# Patient Record
Sex: MALE | Race: WHITE
[De-identification: names, ages, dates, MRNs, and addresses within clinical notes are randomized per-mention and may not be internally consistent; named-entity substitution may affect disease eponyms.]

---

## 2018-09-18 ENCOUNTER — HOSPITAL ENCOUNTER (EMERGENCY)
Dept: HOSPITAL 62 - ER | Age: 63
Discharge: ANOTHER HOSPITAL | End: 2018-09-18
Payer: SELF-PAY

## 2018-09-18 VITALS — SYSTOLIC BLOOD PRESSURE: 146 MMHG | DIASTOLIC BLOOD PRESSURE: 82 MMHG

## 2018-09-18 DIAGNOSIS — I63.8: Primary | ICD-10-CM

## 2018-09-18 DIAGNOSIS — R41.82: ICD-10-CM

## 2018-09-18 DIAGNOSIS — R47.01: ICD-10-CM

## 2018-09-18 DIAGNOSIS — E11.9: ICD-10-CM

## 2018-09-18 DIAGNOSIS — I10: ICD-10-CM

## 2018-09-18 LAB
ADD MANUAL DIFF: NO
ALBUMIN SERPL-MCNC: 4.2 G/DL (ref 3.5–5)
ALP SERPL-CCNC: 66 U/L (ref 38–126)
ALT SERPL-CCNC: 45 U/L (ref 21–72)
ANION GAP SERPL CALC-SCNC: 9 MMOL/L (ref 5–19)
APTT BLD: 33.3 SEC (ref 23.5–35.8)
AST SERPL-CCNC: 34 U/L (ref 17–59)
BASOPHILS # BLD AUTO: 0.1 10^3/UL (ref 0–0.2)
BASOPHILS NFR BLD AUTO: 0.6 % (ref 0–2)
BILIRUB DIRECT SERPL-MCNC: 0.6 MG/DL (ref 0–0.4)
BILIRUB SERPL-MCNC: 1.4 MG/DL (ref 0.2–1.3)
BUN SERPL-MCNC: 31 MG/DL (ref 7–20)
CALCIUM: 9.1 MG/DL (ref 8.4–10.2)
CHLORIDE SERPL-SCNC: 104 MMOL/L (ref 98–107)
CO2 SERPL-SCNC: 22 MMOL/L (ref 22–30)
EOSINOPHIL # BLD AUTO: 0.5 10^3/UL (ref 0–0.6)
EOSINOPHIL NFR BLD AUTO: 5.2 % (ref 0–6)
ERYTHROCYTE [DISTWIDTH] IN BLOOD BY AUTOMATED COUNT: 13.6 % (ref 11.5–14)
ETHANOL SERPL-MCNC: < 10 MG/DL
GLUCOSE SERPL-MCNC: 141 MG/DL (ref 75–110)
HCT VFR BLD CALC: 43.7 % (ref 37.9–51)
HGB BLD-MCNC: 15.4 G/DL (ref 13.5–17)
INR PPP: 1.08
LYMPHOCYTES # BLD AUTO: 1.9 10^3/UL (ref 0.5–4.7)
LYMPHOCYTES NFR BLD AUTO: 18.2 % (ref 13–45)
MCH RBC QN AUTO: 29.6 PG (ref 27–33.4)
MCHC RBC AUTO-ENTMCNC: 35.3 G/DL (ref 32–36)
MCV RBC AUTO: 84 FL (ref 80–97)
MONOCYTES # BLD AUTO: 0.8 10^3/UL (ref 0.1–1.4)
MONOCYTES NFR BLD AUTO: 7.9 % (ref 3–13)
NEUTROPHILS # BLD AUTO: 7 10^3/UL (ref 1.7–8.2)
NEUTS SEG NFR BLD AUTO: 68.1 % (ref 42–78)
PLATELET # BLD: 236 10^3/UL (ref 150–450)
POTASSIUM SERPL-SCNC: 4.7 MMOL/L (ref 3.6–5)
PROT SERPL-MCNC: 7.3 G/DL (ref 6.3–8.2)
PROTHROMBIN TIME: 14.6 SEC (ref 11.4–15.4)
RBC # BLD AUTO: 5.2 10^6/UL (ref 4.35–5.55)
SODIUM SERPL-SCNC: 134.6 MMOL/L (ref 137–145)
TOTAL CELLS COUNTED % (AUTO): 100 %
WBC # BLD AUTO: 10.2 10^3/UL (ref 4–10.5)

## 2018-09-18 PROCEDURE — 93005 ELECTROCARDIOGRAM TRACING: CPT

## 2018-09-18 PROCEDURE — 71045 X-RAY EXAM CHEST 1 VIEW: CPT

## 2018-09-18 PROCEDURE — 36415 COLL VENOUS BLD VENIPUNCTURE: CPT

## 2018-09-18 PROCEDURE — 80307 DRUG TEST PRSMV CHEM ANLYZR: CPT

## 2018-09-18 PROCEDURE — 70496 CT ANGIOGRAPHY HEAD: CPT

## 2018-09-18 PROCEDURE — 83735 ASSAY OF MAGNESIUM: CPT

## 2018-09-18 PROCEDURE — 70498 CT ANGIOGRAPHY NECK: CPT

## 2018-09-18 PROCEDURE — 85730 THROMBOPLASTIN TIME PARTIAL: CPT

## 2018-09-18 PROCEDURE — 85610 PROTHROMBIN TIME: CPT

## 2018-09-18 PROCEDURE — 80053 COMPREHEN METABOLIC PANEL: CPT

## 2018-09-18 PROCEDURE — 70450 CT HEAD/BRAIN W/O DYE: CPT

## 2018-09-18 PROCEDURE — 93010 ELECTROCARDIOGRAM REPORT: CPT

## 2018-09-18 PROCEDURE — 85025 COMPLETE CBC W/AUTO DIFF WBC: CPT

## 2018-09-18 NOTE — ER DOCUMENT REPORT
ED General





- General


Chief Complaint: Altered Mental Status


Stated Complaint: POSSIBLE STROKE


Time Seen by Provider: 09/18/18 18:19


Notes: 





Patient is a 63-year-old male with diabetes mellitus and hypertension that 

presents to the emergency department for chief complaint of altered mental 

status.  History provided by EMS as the patient is not following commands 

currently.  Per EMS the patient was last seen normal at 515.  He started acting 

unusual, not talking, not following commands, he is normally alert, and walking 

normally, and per family they told EMS that he is typically talkative, and does 

not act this way, and never has before.  No other history is obtainable at this 

time, has on history taking the patient is aphasic, and not talking at all at 

this time.


Further history obtained by his wife at bedside, stated that he suddenly 

stopped speaking, and appeared scared, and was not answering questions or 

acting his normal self





Past Medical History: Hypertension, diabetes mellitus


Past Surgical History: Not obtainable at this time


Social History: Smokes cigarettes daily, no alcohol or drug use.


Family History: Reviewed and noncontributory for presenting illness


Allergies: Reviewed, see documented allergy list. 





REVIEW OF SYSTEMS:


Unless otherwise stated in this report the patient's positive and negative 

responses for review of systems for constitutional, eyes, ENT, cardiovascular, 

respiratory, gastrointestinal, neurological, genitourinary, musculoskeletal, 

and integumentary systems and related systems to the presenting problem are 

either as stated in the HPI or were not pertinent or were negative for the 

symptoms and/or complaints related to the presenting medical problem.





PHYSICAL EXAMINATION:





Vital signs reviewed, nursing noted reviewed. 





GENERAL: Well-appearing, well-nourished and in no acute distress.





HEAD: Atraumatic, normocephalic.





EYES: Eyes appear normal, extraocular movements intact, sclera anicteric, 

conjunctiva are normal.





ENT: nares patent, oropharynx clear without exudates.  Moist mucous membranes.





NECK: Normal range of motion, supple without lymphadenopathy





LUNGS: Breath sounds clear to auscultation bilaterally and equal.  No wheezes 

rales or rhonchi.





HEART: Regular rate and rhythm without murmurs





ABDOMEN: Soft, nontender, normoactive bowel sounds.  No rebound, guarding, or 

rigidity. No masses appreciated.





EXTREMITIES: Nontender, good range of motion, no pitting or edema.  





NEUROLOGICAL: Patient moving all limbs, will withdrawal to pain in all limbs, 

appears to have receptive and expressive aphasia at this time, but would shake 

my hand, and follow what appeared to be social norms, in an attempt to 

understand what I was asking him.  But would not follow simple commands such as 

asking him to point to the ceiling with his left hand.  No obvious facial droop





PSYCH: Flat affect, aphasic





SKIN: Warm, Dry, normal turgor, no rashes or lesions noted on exposed skin








- Related Data


Allergies/Adverse Reactions: 


 





No Known Allergies Allergy (Unverified 09/18/18 19:27)


 











Past Medical History





- Social History


Smoking Status: Current Every Day Smoker


Family History: Reviewed & Not Pertinent





Physical Exam





- Vital signs


Vitals: 


 











Pulse Resp BP Pulse Ox


 


 109 H  18   118/71   97 


 


 09/18/18 18:10  09/18/18 18:10  09/18/18 18:10  09/18/18 18:10














Course





- Re-evaluation


Re-evalutation: 





Patient seen and examined vital signs reviewed. 





Laboratory data and imaging were ordered as appropriate for the patient's 

presenting symptoms and complaint, with consideration of any critical or life 

threatening conditions that may be associated with their obtained history and 

exam as noted above.





NIH stroke scale score: 6, for inability to follow commands, and severe aphasia





Results were reviewed when available and demonstrated acute thrombus in the 

left carotid artery, which is consistent with the patient's symptoms of aphasia

, on my repeat evaluation, the patient was speaking, and was still noted to 

have receptive aphasia





I called [facility], for transfer by [facility], for 

acute intervention, for his acute thrombus, causing his severe debilitating 

symptoms of expressive aphasia





Plan for TPA, waiting for callback, if they want me to treat the patient with 

TPA prior to transfer.





I personally discussed the risks and benefits of systemic TPA administration 

for acute CVA, including a 1 in 14 chance for intracranial hemorrhage, and 

other sources of bleeding, this patient I felt the benefits outweigh the risks, 

given that he was aphasic, which is a detriment, and severe disability, the 

patient's wife agreed and consented on the patient's behalf, as he currently 

had expressive aphasia.





Case discussed with [facility], agreed with TPA 

administration, this was ordered, patient was given bolus and infusion, 

reevaluation the patient was improved, patient will be transported, initially 

plan was for air transport, however they were not flying, and ground transfer 

had to be arranged despite best efforts to have air transport, this was 

discussed with both the patient and patient's wife who is at bedside, and they 

agreed with this plan of care.





Additionally the patient was given IV fluids after receiving contrast dye.





Evaluation was most consistent with acute CVA causing expressive aphasia





Results were discussed with the patient at this point after careful 

consideration I feel that that patient should be transferred to [facility] due to acute CVA, that potentially could benefit from 

clot retrieval, case was discussed with Dr. Blaze Damon.  This was 

discussed with the patient and his wife that it is in the best interest for 

their care to be transferred, the risks and benefits of transfer were discussed

, including but not limited to clinical deterioration during transport, 

respiratory distress, and potential for traumatic injuries. Patient agreed with 

this plan of care. 








Patient was seen prior to transport when EMS arrived, was in stable condition, 

and cleared for transport.





*Note is created using voice recognition software and may contain spelling, 

syntax or grammatical errors.








 





Chest X-Ray  09/18/18 00:00


IMPRESSION:  Cannot exclude left lower lobe pneumonia.


 








Head CT  09/18/18 18:19


IMPRESSION:  NORMAL BRAIN CT WITHOUT CONTRAST.


EVIDENCE OF ACUTE STROKE: NO.


 








Head CTA  09/18/18 18:19


IMPRESSION:  NO CTA EVIDENCE OF STENOSIS OR ANEURYSM OF THE [place].


 








Neck CTA  09/18/18 18:19


IMPRESSION:  FILLING DEFECT OCCUPYING MOST OF THE LUMEN OF THE PROXIMAL LEFT 

COMMON CAROTID ARTERY BEGINNING AT THE AORTIC ARCH AND EXTENDING FOR A LENGTH 

OF APPROXIMATELY 2 CM.  THIS COULD REPRESENT A THROMBUS OR A LARGE PLAQUE.  THE 

REMAINDER OF THE RIGHT AND LEFT CAROTID ARTERIES ARE OTHERWISE PATENT WITH NO 

SIGNIFICANT STENOSIS AND NO OCCLUSIONS.  VERTEBRAL ARTERIES ARE ALSO PATENT.


 














Laboratory











  09/18/18 09/18/18 09/18/18





  18:35 18:35 18:35


 


WBC  10.2  


 


RBC  5.20  


 


Hgb  15.4  


 


Hct  43.7  


 


MCV  84  


 


MCH  29.6  


 


MCHC  35.3  


 


RDW  13.6  


 


Plt Count  236  


 


Seg Neutrophils %  68.1  


 


Lymphocytes %  18.2  


 


Monocytes %  7.9  


 


Eosinophils %  5.2  


 


Basophils %  0.6  


 


Absolute Neutrophils  7.0  


 


Absolute Lymphocytes  1.9  


 


Absolute Monocytes  0.8  


 


Absolute Eosinophils  0.5  


 


Absolute Basophils  0.1  


 


PT    14.6


 


INR    1.08


 


APTT    33.3


 


Sodium   134.6 L 


 


Potassium   4.7 


 


Chloride   104 


 


Carbon Dioxide   22 


 


Anion Gap   9 


 


BUN   31 H 


 


Creatinine   1.50 H 


 


Est GFR ( Amer)   57 L 


 


Est GFR (Non-Af Amer)   47 L 


 


Glucose   141 H 


 


Calcium   9.1 


 


Magnesium   2.4 H 


 


Total Bilirubin   1.4 H 


 


Direct Bilirubin   0.6 H 


 


Neonat Total Bilirubin   Not Reportable 


 


Neonat Direct Bilirubin   Not Reportable 


 


Neonat Indirect Bili   Not Reportable 


 


AST   34 


 


ALT   45 


 


Alkaline Phosphatase   66 


 


Total Protein   7.3 


 


Albumin   4.2 


 


Serum Alcohol   < 10 
































- Vital Signs


Vital signs: 


 











Temp Pulse Resp BP Pulse Ox


 


 98.7 F   102 H  18   146/82 H  97 


 


 09/18/18 21:31  09/18/18 21:38  09/18/18 21:38  09/18/18 21:38  09/18/18 21:38














- Laboratory


Result Diagrams: 


 09/18/18 18:35





 09/18/18 18:35


Laboratory results interpreted by me: 


 











  09/18/18





  18:35


 


Sodium  134.6 L


 


BUN  31 H


 


Creatinine  1.50 H


 


Est GFR ( Amer)  57 L


 


Est GFR (Non-Af Amer)  47 L


 


Glucose  141 H


 


Magnesium  2.4 H


 


Total Bilirubin  1.4 H


 


Direct Bilirubin  0.6 H














- EKG Interpretation by Me


Additional EKG results interpreted by me: 





09/18/18 18:36


EKG demonstrates sinus tachycardia with a ventricular rate of 112 bpm, left 

axis deviation, normal intervals, no evidence of acute ischemia on this EKG.





Critical Care Note





- Critical Care Note


Total time excluding time spent on procedures (mins): 80


Comments: 





Critical care time 80 minutes exclusive from separate billable procedures for a 

patient requiring complex medical decision making, and high potential for 

clinical deterioration.  In a patient with acute CVA, requiring TPA 

administration, and multiple repeat evaluations, and discussion with 

specialist.  Time spent obtaining history from patient or surrogate, 

discussions with consultants, development of treatment plan with patient or 

surrogate, evaluation of patient's response to treatment, examination of patient

, ordering and performing treatments and interventions, ordering and review of 

laboratory studies, re-evaluation of patient's condition, ordering and review 

of radiographic studies and review of old charts





Discharge





- Discharge


Clinical Impression: 


 Acute CVA (cerebrovascular accident), Aphasia, Renal impairment





Condition: Critical


Disposition: [facility]

## 2018-09-18 NOTE — RADIOLOGY REPORT (SQ)
EXAM DESCRIPTION:  CTA HEAD



COMPLETED DATE/TIME:  9/18/2018 6:38 pm



REASON FOR STUDY:  aphasic



COMPARISON:  CT head 9/18/2018



TECHNIQUE:  Post IV contrast scanning, thin section axial imaging through the brain to evaluate the a
rterial structures.  Source and MIP images are saved and reviewed on PACS.

Advanced 3D imaging as volume-rendering, MIPs, SSD performed? yes

All CT scanners at this facility use dose modulation, iterative reconstruction, and/or weight based d
osing when appropriate to reduce radiation dose to as low as reasonably achievable (ALARA).

[facility]: Dose Right  [facility]: CareDose    [facility]: Dose Right    [facility]: Teradose 4D    [facility]: Smart Technologies



CONTRAST TYPE AND DOSE:  Not recorded.



RENAL FUNCTION:  Waived by emergency room physician.



LIMITATIONS:  None.



FINDINGS:  [place]: The anterior, middle, posterior cerebral arteries are all patent.  No ev
idence of aneurysm or focal stenosis.

POSTERIOR CIRCULATION: The distal vertebral arteries are patent as is the basilar artery. No aneurysm
.

BRAIN: No gross enhancing lesions as visualized.  The superior cerebral hemispheres are not included 
in the field of view.

BONES: Intact as visualized.

SINUSES: No fluid or mucosal thickening.

OTHER: No other significant finding.



IMPRESSION:  NO CTA EVIDENCE OF STENOSIS OR ANEURYSM OF THE [place].



TECHNICAL DOCUMENTATION:  JOB ID:  [id number]

Quality ID # 436: Final reports with documentation of one or more dose reduction techniques (e.g., Au
tomated exposure control, adjustment of the mA and/or kV according to patient size, use of iterative 
reconstruction technique)

 2011 [employer]- All Rights Reserved



Reading location - IP/workstation name: [email or web address]

## 2018-09-18 NOTE — EKG REPORT
SEVERITY:- OTHERWISE NORMAL ECG -

SINUS TACHYCARDIA

:

Confirmed by: Temitope Chan MD 18-Sep-2018 23:30:25

## 2018-09-18 NOTE — RADIOLOGY REPORT (SQ)
EXAM DESCRIPTION:  CT HEAD WITHOUT



COMPLETED DATE/TIME:  9/18/2018 6:35 pm



REASON FOR STUDY:  aphasic STROKE PROTOCOL



COMPARISON:  None.



TECHNIQUE:  Axial images acquired through the brain without intravenous contrast.  Images reviewed wi
th bone, brain and subdural windows.  Additional sagittal and coronal reconstructions were generated.
 Images stored on PACS.

All CT scanners at this facility use dose modulation, iterative reconstruction, and/or weight based d
osing when appropriate to reduce radiation dose to as low as reasonably achievable (ALARA).

[facility]: Dose Right  [facility]: CareDose    [facility]: Dose Right    [facility]: Teradose 4D    [facility]: Smart Technologies



RADIATION DOSE:   mGy.



LIMITATIONS:  None.



FINDINGS:  VENTRICLES: Normal size and contour.

CEREBRUM: No masses.  No hemorrhage.  No midline shift.  No evidence for acute infarction. Normal gra
y/white matter differentiation. No areas of low density in the white matter.

CEREBELLUM: No masses.  No hemorrhage.  No alteration of density.  No evidence for acute infarction.

EXTRAAXIAL SPACES: No fluid collections.  No masses.

ORBITS AND GLOBE: No intra- or extraconal masses.  Normal contour of globe without masses.

CALVARIUM: No fracture.

PARANASAL SINUSES: No fluid or mucosal thickening.

SOFT TISSUES: No mass or hematoma.

OTHER: No other significant finding.



IMPRESSION:  NORMAL BRAIN CT WITHOUT CONTRAST.

EVIDENCE OF ACUTE STROKE: NO.



COMMENT:   Pertinent positive or negative findings of the imaging study reported as a CRITICAL EXAM spenser DELUNAE  at18:41 on 9/18/2018.

Category of Critical Exam: Stroke protocol.

Quality ID # 436: Final reports with documentation of one or more dose reduction techniques (e.g., Au
tomated exposure control, adjustment of the mA and/or kV according to patient size, use of iterative 
reconstruction technique)



TECHNICAL DOCUMENTATION:  JOB ID:  [id number]

 2011 [employer]- All Rights Reserved



Reading location - IP/workstation name: [email or web address]

## 2018-09-19 NOTE — EKG REPORT
SEVERITY:- OTHERWISE NORMAL ECG -

SINUS TACHYCARDIA

:

Confirmed by: Temitope Chan MD 19-Sep-2018 11:42:53

## 2024-11-20 NOTE — RADIOLOGY REPORT (SQ)
EXAM DESCRIPTION:  CTA NECK



COMPLETED DATE/TIME:  9/18/2018 6:38 pm



REASON FOR STUDY:  aphasic



COMPARISON:  None.



TECHNIQUE:  Axial dynamic scanning technique with  dynamic contrast enhancement through the extra-cra
nial carotid and vertebral  arteries.  Multiplanar reconstruction.  3-D MIPS and Volume-rendered imag
es  acquired at the workstation and saved to PACS.  Images are reviewed in soft  tissue, bone, lung w
indows.

All CT scanners at this facility use dose modulation, iterative reconstruction, and/or weight based d
osing when appropriate to reduce radiation dose to as low as reasonably achievable (ALARA).

[facility]: Dose Right  [facility]: CareDose    [facility]: Dose Right    [facility]: Teradose 4D    [facility]: [employer]



CONTRAST TYPE AND DOSE:  70 mL Omnipaque 350- low osmolar.



RENAL FUNCTION:  Not available.



LIMITATIONS:  None.



FINDINGS:  AORTIC ARCH: Normal three-vessel origin.  Bilateral subclavian arteries are patent.  No  d
issection.

RIGHT CAROTIDS: Patent common, internal and external carotid arteries without suggestion of significa
nt stenosis or irregular plaque.  No dissection.

RIGHT VERTEBRAL: Patent.  No dissection.

LEFT CAROTIDS: There is a filling defect occupying most of the lumen of the common carotid artery beg
inning at the aortic arch and extending for a length of approximately 2 cm.  This is best demonstrate
d on axial series 3, image 25 and sagittal series 202, image 54.  The remainder of the common carotid
 artery is normal in caliber and otherwise patent.  Patent internal and external carotid arteries wit
hout suggestion of significant stenosis or irregular plaque.  No dissection.

LEFT VERTEBRAL: Patent.  No dissection.

OTHER: No other significant finding.

OTHER: 3-D  reconstructions confirm findings.



IMPRESSION:  FILLING DEFECT OCCUPYING MOST OF THE LUMEN OF THE PROXIMAL LEFT COMMON CAROTID ARTERY BE
GINNING AT THE AORTIC ARCH AND EXTENDING FOR A LENGTH OF APPROXIMATELY 2 CM.  THIS COULD REPRESENT A 
THROMBUS OR A LARGE PLAQUE.  THE REMAINDER OF THE RIGHT AND LEFT CAROTID ARTERIES ARE OTHERWISE PATEN
T WITH NO SIGNIFICANT STENOSIS AND NO OCCLUSIONS.  VERTEBRAL ARTERIES ARE ALSO PATENT.



COMMENT:   Pertinent findings on the imaging study reported as a CRITICAL RESULT to IRASEMA Martinez
8:50 on 9/18/2018.

Category of Critical Result: Thrombus or large plaque in the proximal left common carotid artery.

Quality ID #195: Measurements of distal internal carotid diameter were used as the denominator for st
enosis measurement.



TECHNICAL DOCUMENTATION:  JOB ID:  [id number]

Quality ID # 436: Final reports with documentation of one or more dose reduction techniques (e.g., Au
tomated exposure control, adjustment of the mA and/or kV according to patient size, use of iterative 
reconstruction technique)

 2011 [employer]- All Rights Reserved



Reading location - IP/workstation name: [email or web address]

## 2025-07-31 NOTE — RADIOLOGY REPORT (SQ)
EXAM DESCRIPTION:  CHEST SINGLE VIEW



COMPLETED DATE/TIME:  9/18/2018 6:29 pm



REASON FOR STUDY:  STROKE PROTOCOL



COMPARISON:  None.



EXAM PARAMETERS:  NUMBER OF VIEWS: One view.

TECHNIQUE: Single frontal radiographic view of the chest acquired.

RADIATION DOSE: NA

LIMITATIONS: None.



FINDINGS:  LUNGS AND PLEURA: Ill-defined opacification in the left base.

MEDIASTINUM AND HILAR STRUCTURES: No masses.  Contour normal.

HEART AND VASCULAR STRUCTURES: Heart normal in size.  Normal vasculature.

BONES: No acute findings.

HARDWARE: None in the chest.

OTHER: No other significant finding.



IMPRESSION:  Cannot exclude left lower lobe pneumonia.



TECHNICAL DOCUMENTATION:  JOB ID:  [id number]

 2011 [employer]- All Rights Reserved



Reading location - IP/workstation name: [email or web address] Lov 7/21/2025  Future Appointments   Date Time Provider Department Center   11/7/2025  8:00 AM Kerry Tay DO [facility]